# Patient Record
Sex: FEMALE | Race: WHITE | Employment: FULL TIME | URBAN - METROPOLITAN AREA
[De-identification: names, ages, dates, MRNs, and addresses within clinical notes are randomized per-mention and may not be internally consistent; named-entity substitution may affect disease eponyms.]

---

## 2019-09-30 ENCOUNTER — ANCILLARY PROCEDURE (OUTPATIENT)
Dept: MRI IMAGING | Facility: CLINIC | Age: 39
End: 2019-09-30
Attending: FAMILY MEDICINE

## 2019-09-30 DIAGNOSIS — M25.532 LEFT WRIST PAIN: ICD-10-CM

## 2019-11-25 ENCOUNTER — THERAPY VISIT (OUTPATIENT)
Dept: OCCUPATIONAL THERAPY | Facility: CLINIC | Age: 39
End: 2019-11-25
Payer: COMMERCIAL

## 2019-11-25 DIAGNOSIS — M79.645 PAIN OF LEFT THUMB: ICD-10-CM

## 2019-11-25 PROCEDURE — 97165 OT EVAL LOW COMPLEX 30 MIN: CPT | Mod: GO | Performed by: OCCUPATIONAL THERAPIST

## 2019-11-25 PROCEDURE — 97110 THERAPEUTIC EXERCISES: CPT | Mod: GO | Performed by: OCCUPATIONAL THERAPIST

## 2019-11-25 PROCEDURE — 97760 ORTHOTIC MGMT&TRAING 1ST ENC: CPT | Mod: GO | Performed by: OCCUPATIONAL THERAPIST

## 2019-11-25 NOTE — PROGRESS NOTES
Incomplete           Hand Therapy Initial Evaluation     Current Date:  11/25/2019     Diagnosis: MRI: Healed non-displaced Left  proximal-volar trapezium fx and mild bone marrow edema of ulnar styloid likely related to contusion   DOI: 9/8/19     Post:  11w 1d     Precautions: ROM and strengthening, removable orthosis for protection PRN        Subjective:  Manuela Marcano is a 39 year old female.     Patient reports symptoms of the left wrist which occurred due to fall from skateboard. Since onset symptoms are Gradually getting better.  Special tests:  x-ray and MRI.  Previous treatment: splinted x 2wks, casted for 3 wks then again for another 18days.    General health as reported by patient is excellent.  Pertinent medical history includes:None  Medical allergies:none.  Surgical history: none.  Medication history: None.     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  Currently working in normal job without restrictions  Job Tasks: Computer Work, Lifting, Carrying, Pushing, Pulling, Repetitive Tasks        Occupational Profile Information:  Right hand dominant  Prior functional level:  no limitations  Patient reports symptoms of pain, stiffness/loss of motion and weakness/loss of strength  Barriers include:none  Mobility: No difficulty  Transportation: drives  Leisure activities/hobbies: 10 and 6 yo kids, active: roller blade, bike, travel, read, run        Functional Outcome Measure:   Upper Extremity Functional Index Score:  SCORE:   Column Totals: /80: 36   (A lower score indicates greater disability.)        Objective:  Pain Level (Scale 0-10):    11/25/2019   At Rest 1   With Use 5      Pain Description:  Date 11/25/2019   Location wrist and thumb   Pain Quality Sharp, Shooting and stiff   Frequency intermittent or daily     Pain is worst  daytime or nighttime (1x weekly)   Exacerbated by  carrying objects too long, typing   Relieved by stretch   Progression Gradually improving       Edema:  Mild at base of thumb     Sensation:  WNL throughout all nerve distributions; per patient report     ROM:  ROM  Pain Report: - none  + mild    ++ moderate    +++ severe   Wrist 11/25/2019 11/25/2019   AROM (PROM) R L   Extension 77 77   Flexion 90 63   RD 25 32   UD 48 10++   Supination 80 60   Pronation 80 85      ROM  Thumb 11/25/2019 11/25/2019   AROM  (PROM) R L   MP 0/60 0/25   IP 0/40 0/15  /70 w/ blocking   RABD 57 45   PABD 55 50   Kapandji Opposition Scale (0-10/10) 10 8               Strength   (Measured in pounds)  Pain Report: - none  + mild    ++ moderate    +++ severe    11/25/2019 11/25/2019   Trials R L   1  2  3 69  65  69 25  35  22   Average 68 27      Lat Pinch 11/25/2019 11/25/2019   Trials R L   1  2  3 15  16  17 4  4  4   Average 16 4      3 Pt Pinch 11/25/2019 11/25/2019   Trials R L   1  2  3 17  16  16 2  2  2   Average 16 2            Assessment:  Patient presents with symptoms consistent with diagnosis of see above,  with non-surgical intervention.      Patient's limitations or Problem List includes:  Pain, Decreased ROM/motion, Increased edema, Decreased , Decreased pinch, Decreased coordination and Decreased dexterity of the left wrist and thumb which interferes with the patient's ability to perform Self Care Tasks (dressing, eating, bathing, hygiene/toileting), Work Tasks, Sleep Patterns, Recreational Activities and Household Chores as compared to previous level of function.     Rehab Potential:  Excellent - Return to full activity, no limitations     Patient will benefit from skilled Occupational Therapy to increase ROM, flexibility, motion, overall strength,  strength, pinch strength, stability of thumb, coordination and dexterity and decrease pain to return to previous activity level and resume normal daily tasks and to reach their rehab potential.     No barrierBarriers to Learning:  No barrier     Communication Issues:  Patient appears to be able to  clearly communicate and understand verbal and written communication and follow directions correctly.     Chart Review: Chart Review and Simple history review with patient     Identified Performance Deficits: bathing/showering, toileting, dressing, hygiene and grooming, health management and maintenance, home establishment and management, meal preparation and cleanup, shopping, work, volunteer activities and leisure activities    Assessment of Occupational Performance:  3-5 Performance Deficits     Clinical Decision Making (Complexity): Low complexity     Treatment Explanation:  The following has been discussed with the patient:  RX ordered/plan of care  Anticipated outcomes  Possible risks and side effects     Plan:  Frequency:  1 X week, once daily  Duration:  for 6 weeks     Treatment Plan:   Modalities:  Paraffin  Therapeutic Exercise:  AROM, PROM, Tendon Gliding, Blocking, Isometrics and Stabilization  Neuromuscular re-education:  Kinesthetic Training, Proprioceptive Training, Kinesiotaping, Isometrics and Stabilization  Manual Techniques:  Myofascial release and Manual edema mobilization  Orthotic Fabrication:  Static orthosis; HBTS  Self Care:  Self Care Tasks  Discharge Plan:  Achieve all LTG.  Independent in home treatment program.  Reach maximal therapeutic benefit.     Home Exercise Program:  AROM of thumb and wrist  Orthosis HBTS per comfort during the day and on at night  Stretching flexors and extensors        Next Visit:  Progress to Strengthening as tolerated  Check orthosis  Paraffin for AROM

## 2019-12-03 ENCOUNTER — THERAPY VISIT (OUTPATIENT)
Dept: OCCUPATIONAL THERAPY | Facility: CLINIC | Age: 39
End: 2019-12-03
Payer: COMMERCIAL

## 2019-12-03 DIAGNOSIS — M79.645 PAIN OF LEFT THUMB: Primary | ICD-10-CM

## 2019-12-03 PROCEDURE — 97110 THERAPEUTIC EXERCISES: CPT | Mod: GO | Performed by: OCCUPATIONAL THERAPIST

## 2019-12-03 PROCEDURE — 97140 MANUAL THERAPY 1/> REGIONS: CPT | Mod: GO | Performed by: OCCUPATIONAL THERAPIST

## 2019-12-03 NOTE — PROGRESS NOTES
SOAP Note - Hand Therapy    Current Date:  12/3/2019    Diagnosis: MRI: Healed non-displaced left proximal-volar trapezium fx ad mild bone marrow edema of ulnar styloid likely related to contusion  DOI:9/8/19    Precautions: none    S: see daily flow sheet    Objective:  ROM  Pain Report: - none  + mild    ++ moderate    +++ severe   Wrist 11/25/19 11/25/19 12/3/2019   AROM (PROM) R L R   Extension 77 77 80   Flexion 90 63 77   RD 25 32    UD 48 10++    Supination 80 60    Pronation 90 85      ROM  Thumb 11/25/2019 11/25/2019 12/3/19   AROM  (PROM) R L R   MP 0/60 0/25 0/55   IP 0/40 0/15 0/65   RABD 57 45    PABD 55 50    Kapandji Opposition Scale (0-10/10) 10 8        Please refer to the daily flowsheet for treatment provided today.     Home Exercise Program:  AROM of thumb and wrist  Stretching flexors and extensors  First Dorsal Interosseous strengthening   C positioning w/ trace a tennis ball    Next Visit:  Progress to Strengthening, wrist and thumb stability  Check orthosis  Paraffin for AROM

## 2019-12-10 ENCOUNTER — THERAPY VISIT (OUTPATIENT)
Dept: OCCUPATIONAL THERAPY | Facility: CLINIC | Age: 39
End: 2019-12-10
Payer: COMMERCIAL

## 2019-12-10 DIAGNOSIS — M79.645 PAIN OF LEFT THUMB: ICD-10-CM

## 2019-12-10 PROCEDURE — 97110 THERAPEUTIC EXERCISES: CPT | Mod: GO | Performed by: OCCUPATIONAL THERAPIST

## 2019-12-10 PROCEDURE — 97140 MANUAL THERAPY 1/> REGIONS: CPT | Mod: GO | Performed by: OCCUPATIONAL THERAPIST

## 2019-12-23 ENCOUNTER — THERAPY VISIT (OUTPATIENT)
Dept: OCCUPATIONAL THERAPY | Facility: CLINIC | Age: 39
End: 2019-12-23
Payer: COMMERCIAL

## 2019-12-23 DIAGNOSIS — M79.645 PAIN OF LEFT THUMB: ICD-10-CM

## 2019-12-23 PROCEDURE — 97110 THERAPEUTIC EXERCISES: CPT | Mod: GO | Performed by: OCCUPATIONAL THERAPIST

## 2019-12-23 NOTE — PROGRESS NOTES
Discharge Note - Hand Therapy    Current Date:  12/23/2019  Diagnosis: MRI: Healed non-displaced left proximal-volar trapezium fx ad mild bone marrow edema of ulnar styloid likely related to contusion  DOI:9/8/19  Post: > 12 weeks    Reporting period is from 11/25/19 to 12/23/2019    Subjective:   Subjective changes as noted by patient:  I feel like I'm doing really well, I'm not even thinking about not using my hand, it's like normal again.  Functional changes noted by patient:  Improvement in Self Care Tasks (dressing, eating, bathing, hygiene/toileting), Work Tasks, Recreational Activities and Household Chores, and driving  Patient has noted adverse reaction to:  None        Objective:  See Objective measurements in Physical Exam    ROM  Pain Report: - none  + mild    ++ moderate    +++ severe   Wrist 11/25/19 11/25/19 12/3/2019   AROM (PROM) R L R   Extension 77 77 80   Flexion 90 63 77   RD 25 32    UD 48 10++    Supination 80 60    Pronation 90 85      ROM  Thumb 11/25/2019 11/25/2019 12/3/19 12/23/19   AROM  (PROM) R L L L   MP 0/60 0/25 0/55 0/55   IP 0/40 0/15 0/65 0/65   RABD 57 45  60   PABD 55 50  50   Kapandji Opposition Scale (0-10/10) 10 8  10     Strength   (Measured in pounds)  Pain Report: - none  + mild    ++ moderate    +++ severe    12/23/2019 12/23/2019   Trials R L   1  2  3 65  55  50 40  42  46   Average 57 43     Lat Pinch 12/23/2019 12/23/2019   Trials R L   1  2  3 17  17  16 14  13  13   Average 17 13     3 Pt Pinch 12/23/2019 12/23/2019   Trials R L   1  2  3 15  14  14 10  11  10   Average 14 10         Assessment:  Response to therapy has been improvement to:  ROM of Wrist:  All Planes  Thumb:  All Planes  Strength:   and pinch  Pain:  intensity of pain is decreased, duration of pain is decreased and less tender over affected area  Work Performance:  No longer affected  Self Care Skills:  independent  Sensitivity:  intensity is less  Coordination:  Using thumb for  activities for increased coordination without pain  Appropriateness of Rx I have re-evaluated this patient and find that the nature, scope, duration and intensity of the therapy is appropriate for the medical condition of the patient.  Overall Assessment:  Patient is ready to be discharged from therapy and continue their home treatment program.  STG/LTG:  See goal sheet for details and updates.    Plan:  Frequency/Duration:  Discharge from Hand Therapy; continue home program.    Recommendations for Home Program - Therapeutic Exercise:  AROM and Stabilization    Home Exercise Program:  AROM of thumb and wrist  Stretching flexors and extensors  First Dorsal Interosseous strengthening   C positioning w/ trace a tennis ball

## 2020-11-13 ENCOUNTER — OFFICE VISIT (OUTPATIENT)
Dept: ORTHOPEDICS | Facility: CLINIC | Age: 40
End: 2020-11-13
Payer: COMMERCIAL

## 2020-11-13 DIAGNOSIS — M89.9 LESION OF BONE OF KNEE: Primary | ICD-10-CM

## 2020-11-13 PROCEDURE — 99203 OFFICE O/P NEW LOW 30 MIN: CPT | Performed by: PHYSICIAN ASSISTANT

## 2020-11-13 NOTE — NURSING NOTE
Reason For Visit:   Chief Complaint   Patient presents with     Consult     Slipped on ice 11/13, XR found incidential findings.        There were no vitals taken for this visit.    Pain Assessment  Patient Currently in Pain: Sherita Alcaraz ATC

## 2020-11-13 NOTE — PROGRESS NOTES
Chief Complaint: Right proximal tibia bone lesion    History: Manuela is a 40-year-old female here with her  today for evaluation of a right proximal tibia bone lesion found on x-ray yesterday.  The patient slipped on ice yesterday morning and was seen in the emergency room due to right lower leg pain.  It was more in the lower calf area.  They took an x-ray and found a right proximal medial tibial bone lesion that they felt needed to be assessed.  She was referred here for further work-up.  This morning, her lower leg is feeling much better and is not sore at all.  Prior to the injury she really did not have any pain in the right lower leg.  She denies any swelling or ecchymosis.  She does report pain off and on in the medial knee area.  She reports having an injury when she was 16 running track.  That would bother her periodically over many years.  She occasionally feels discomfort with changes of the weather.  Again she denies any swelling or effusion.  She is very active and this does not inhibit her from doing her activities.  She is not taking anything for pain.  She denies any locking or giving way.  She denies any medical problems.  No recent illnesses.  No fever, chills, night sweats.  No weight loss.  No other concerns.    PastMedHx:  None    Past Surgical History:   Procedure Laterality Date     EXTRACTION(S) DENTAL     -   - No difficulty with surgery or anesthesia    FamHx:  Non-contributory    Social History     Tobacco Use     Smoking status: Never Smoker   Substance Use Topics     Alcohol use: No     Comment: none during pregnancy       Meds:   No current outpatient medications on file.     No current facility-administered medications for this visit.        Allergies:  No Known Allergies    Review of Systems:  ROS: 10 point ROS neg other than the symptoms noted above in the HPI.    Physical Exam: There were no vitals taken for this visit.  Manuela is a healthy-appearing 40-year-old  female who is alert and oriented in no apparent distress.  She has a nonantalgic reciprocal gait today without gait assistance.  Her right lower leg and calf area show no swelling or tenderness.  She has full ankle and foot range of motion on the right.  No edema.  Right knee is without effusion or swelling.  Negative valgus and varus stress testing.  Negative Lockman's test.  She does have mild tenderness to palpation over the left medial proximal tibia in the area of the pes anserine bursa.  Resisted strength testing is 5 out of 5 with knee extension and knee flexion without pain.  Dorsiflexion and plantar flexion strength is 5 out of 5.  She is neurovascularly intact distally.    Imaging: AP and lateral x-ray of the right tibia from an outside facility yesterday shows a proximal posterior medial area of sclerosis with thickened cortex, only seen on the AP view in the area of the lesion.  No sign of fracture or lucency.  No significant cortical irregularity.  Knee joint has a limited view but shows no significant arthritis.  Rest of the tibia and fibula are unremarkable.    Impression: 40-year-old female without significant pain, with likely incidental finding of benign bone lesion of the right proximal medial tibia    Plan: Given the patient's symptoms, exam and x-rays, this seems to be a benign bone lesion that has likely been there for some time.  I explained to her that usually if this was something malignant, she would have more bony destruction and more symptoms.  This may represent an old healed stress fracture, which may correspond with her injuries in the past related to running.  The bone is not weak, it appears very strong on x-ray, and she has no strength limitations on exam.  Based on these findings, we would not recommend any further imaging or biopsy.  If she feels concerned, we certainly could order an MRI to further evaluate this lesion.  In my opinion, it seems that she has a mild Pes anserine  bursitis, which we talked about treating with ice and stretching.  Certainly at any time, if she notices swelling, increased pain or difficulty walking, we would see her back or order an MRI if she wants to call us.  They understand and agree with plan of care.  They are very happy with this result.  All questions have been answered.  I did also review this case and x-rays with Dr. Clark and he is in agreement with this plan.

## 2020-11-13 NOTE — LETTER
2020         RE: Manuela Marcano  [address] [facility] [address]        Dear Colleague,    Thank you for referring your patient, Manuela Marcano, to the [facility]. Please see a copy of my visit note below.    Chief Complaint: Right proximal tibia bone lesion    History: Manuela is a 40-year-old female here with her  today for evaluation of a right proximal tibia bone lesion found on x-ray yesterday.  The patient slipped on ice yesterday morning and was seen in the emergency room due to right lower leg pain.  It was more in the lower calf area.  They took an x-ray and found a right proximal medial tibial bone lesion that they felt needed to be assessed.  She was referred here for further work-up.  This morning, her lower leg is feeling much better and is not sore at all.  Prior to the injury she really did not have any pain in the right lower leg.  She denies any swelling or ecchymosis.  She does report pain off and on in the medial knee area.  She reports having an injury when she was 16 running track.  That would bother her periodically over many years.  She occasionally feels discomfort with changes of the weather.  Again she denies any swelling or effusion.  She is very active and this does not inhibit her from doing her activities.  She is not taking anything for pain.  She denies any locking or giving way.  She denies any medical problems.  No recent illnesses.  No fever, chills, night sweats.  No weight loss.  No other concerns.    PastMedHx:  None    Past Surgical History:   Procedure Laterality Date     EXTRACTION(S) DENTAL     -   - No difficulty with surgery or anesthesia    FamHx:  Non-contributory    Social History     Tobacco Use     Smoking status: Never Smoker   Substance Use Topics     Alcohol use: No     Comment: none during pregnancy       Meds:   No current outpatient medications on file.     No current facility-administered  medications for this visit.        Allergies:  No Known Allergies    Review of Systems:  ROS: 10 point ROS neg other than the symptoms noted above in the HPI.    Physical Exam: There were no vitals taken for this visit.  Manuela is a healthy-appearing 40-year-old female who is alert and oriented in no apparent distress.  She has a nonantalgic reciprocal gait today without gait assistance.  Her right lower leg and calf area show no swelling or tenderness.  She has full ankle and foot range of motion on the right.  No edema.  Right knee is without effusion or swelling.  Negative valgus and varus stress testing.  Negative Lockman's test.  She does have mild tenderness to palpation over the left medial proximal tibia in the area of the pes anserine bursa.  Resisted strength testing is 5 out of 5 with knee extension and knee flexion without pain.  Dorsiflexion and plantar flexion strength is 5 out of 5.  She is neurovascularly intact distally.    Imaging: AP and lateral x-ray of the right tibia from an outside facility yesterday shows a proximal posterior medial area of sclerosis with thickened cortex, only seen on the AP view in the area of the lesion.  No sign of fracture or lucency.  No significant cortical irregularity.  Knee joint has a limited view but shows no significant arthritis.  Rest of the tibia and fibula are unremarkable.    Impression: 40-year-old female without significant pain, with likely incidental finding of benign bone lesion of the right proximal medial tibia    Plan: Given the patient's symptoms, exam and x-rays, this seems to be a benign bone lesion that has likely been there for some time.  I explained to her that usually if this was something malignant, she would have more bony destruction and more symptoms.  This may represent an old healed stress fracture, which may correspond with her injuries in the past related to running.  The bone is not weak, it appears very strong on x-ray, and she has  no strength limitations on exam.  Based on these findings, we would not recommend any further imaging or biopsy.  If she feels concerned, we certainly could order an MRI to further evaluate this lesion.  In my opinion, it seems that she has a mild Pes anserine bursitis, which we talked about treating with ice and stretching.  Certainly at any time, if she notices swelling, increased pain or difficulty walking, we would see her back or order an MRI if she wants to call us.  They understand and agree with plan of care.  They are very happy with this result.  All questions have been answered.  I did also review this case and x-rays with Dr. Clark and he is in agreement with this plan.      Judith Escamilla PA-C